# Patient Record
Sex: MALE | Race: WHITE
[De-identification: names, ages, dates, MRNs, and addresses within clinical notes are randomized per-mention and may not be internally consistent; named-entity substitution may affect disease eponyms.]

---

## 2019-06-05 ENCOUNTER — HOSPITAL ENCOUNTER (OUTPATIENT)
Dept: HOSPITAL 72 - PAN | Age: 64
Discharge: HOME | End: 2019-06-05
Payer: COMMERCIAL

## 2019-06-05 VITALS — BODY MASS INDEX: 24.38 KG/M2 | WEIGHT: 180 LBS | HEIGHT: 72 IN

## 2019-06-05 VITALS — SYSTOLIC BLOOD PRESSURE: 99 MMHG | DIASTOLIC BLOOD PRESSURE: 52 MMHG

## 2019-06-05 DIAGNOSIS — Z86.73: ICD-10-CM

## 2019-06-05 DIAGNOSIS — K62.5: Primary | ICD-10-CM

## 2019-06-05 DIAGNOSIS — K21.9: ICD-10-CM

## 2019-06-05 DIAGNOSIS — G40.909: ICD-10-CM

## 2019-06-05 PROCEDURE — 99202 OFFICE O/P NEW SF 15 MIN: CPT

## 2019-06-05 NOTE — CONSULTATION
DATE OF CONSULTATION:  06/05/2019

GASTROENTEROLOGY CONSULTATION



CONSULTING PHYSICIAN:  Tye Merlos M.D.



CHIEF COMPLAINT:  Rectal bleeding.



HISTORY OF PRESENT ILLNESS:  This is a very pleasant 64-year-old male with

past medical history of CVA, history of brain tumor status post surgery,

history of seizures, who had presented with complaint of two months of

rectal bleeding and GERD.



PAST SURGICAL HISTORY:  Brain surgery.



MEDICATIONS:  Please see medication reconciliation list.



FAMILY HISTORY:  No family history of GI malignancies.



SOCIAL HISTORY:  The patient does not have history of tobacco, alcohol, or

IV drug abuse.



ALLERGIES:  No known drug allergies.



REVIEW OF SYSTEMS:  Limited.



PHYSICAL EXAMINATION:

VITAL SIGNS:  Temperature 97, blood pressure is 99/52, pulse _____,

respirations 20.

HEENT:  Normocephalic and atraumatic.  Sclerae anicteric.

NECK:  Supple.  No evidence of obvious lymphadenopathy.

CARDIOVASCULAR:  Regular rate and rhythm.  Plus S1 and S2.  No obvious

murmur.

LUNGS:  Clear to auscultation bilaterally.

ABDOMEN:  Positive bowel sounds.  Soft and nontender.  No rebound.  No

guarding.  No peritoneal sign.

EXTREMITIES:  No cyanosis, no clubbing, no edema.



ASSESSMENT AND PLAN:  This is a 64-year-old male with rectal bleeding.  No

prior history of endoscopy nor colonoscopy.  Also, the wife mentions that

the patient has a lot of GERD.  Plan to obtain authorization for EGD and

colonoscopy.  Procedure was explained to the patient and his wife and the

prep was given, pending authorization.









  ______________________________________________

  Tye Merlos M.D.





DR:  RACHEL

D:  06/05/2019 13:59

T:  06/05/2019 20:28

JOB#:  [id number]

CC:

## 2019-08-01 ENCOUNTER — HOSPITAL ENCOUNTER (OUTPATIENT)
Dept: HOSPITAL 72 - PAN | Age: 64
Discharge: HOME | End: 2019-08-01
Payer: COMMERCIAL

## 2019-08-01 DIAGNOSIS — K59.00: Primary | ICD-10-CM

## 2019-08-01 DIAGNOSIS — K29.70: ICD-10-CM

## 2019-08-01 PROCEDURE — 99212 OFFICE O/P EST SF 10 MIN: CPT

## 2019-08-01 NOTE — GENERAL PROGRESS NOTE
Assessment/Plan


Problem List:  


(1) Constipation


ICD Codes:  K59.00 - Constipation, unspecified


SNOMED:  89232334


(2) Gastritis


ICD Codes:  K29.70 - Gastritis, unspecified, without bleeding


SNOMED:  3807092


Assessment/Plan:


treat for HP


RTC 3 months


linzess


anusol HC


Sitz bath





Subjective


ROS Limited/Unobtainable:  Yes


Allergies:  


Coded Allergies:  


     No Known Allergies (Unverified , 6/6/19)





Objective


General Appearance:  alert


EENT:  normal ENT inspection


Neck:  supple


Cardiovascular:  normal rate


Respiratory/Chest:  lungs clear


Abdomen:  normal bowel sounds, non tender, soft


Extremities:  non-tender











Tye Merlos MD Aug 1, 2019 14:29